# Patient Record
Sex: FEMALE | Race: BLACK OR AFRICAN AMERICAN
[De-identification: names, ages, dates, MRNs, and addresses within clinical notes are randomized per-mention and may not be internally consistent; named-entity substitution may affect disease eponyms.]

---

## 2020-12-03 ENCOUNTER — HOSPITAL ENCOUNTER (EMERGENCY)
Dept: HOSPITAL 72 - EMR | Age: 55
Discharge: HOME | End: 2020-12-03
Payer: MEDICAID

## 2020-12-03 VITALS — DIASTOLIC BLOOD PRESSURE: 80 MMHG | SYSTOLIC BLOOD PRESSURE: 134 MMHG

## 2020-12-03 VITALS — BODY MASS INDEX: 37.22 KG/M2 | WEIGHT: 260 LBS | HEIGHT: 70 IN

## 2020-12-03 VITALS — SYSTOLIC BLOOD PRESSURE: 140 MMHG | DIASTOLIC BLOOD PRESSURE: 88 MMHG

## 2020-12-03 DIAGNOSIS — Z90.710: ICD-10-CM

## 2020-12-03 DIAGNOSIS — I49.1: Primary | ICD-10-CM

## 2020-12-03 DIAGNOSIS — R00.2: ICD-10-CM

## 2020-12-03 DIAGNOSIS — Z88.5: ICD-10-CM

## 2020-12-03 LAB
ADD MANUAL DIFF: NO
ALBUMIN SERPL-MCNC: 3.5 G/DL (ref 3.4–5)
ALBUMIN/GLOB SERPL: 0.9 {RATIO} (ref 1–2.7)
ALP SERPL-CCNC: 66 U/L (ref 46–116)
ALT SERPL-CCNC: 15 U/L (ref 12–78)
ANION GAP SERPL CALC-SCNC: 8 MMOL/L (ref 5–15)
APTT BLD: 27 SEC (ref 23–33)
AST SERPL-CCNC: 12 U/L (ref 15–37)
BASOPHILS NFR BLD AUTO: 1.5 % (ref 0–2)
BILIRUB SERPL-MCNC: 0.3 MG/DL (ref 0.2–1)
BUN SERPL-MCNC: 11 MG/DL (ref 7–18)
CALCIUM SERPL-MCNC: 8.6 MG/DL (ref 8.5–10.1)
CHLORIDE SERPL-SCNC: 103 MMOL/L (ref 98–107)
CK SERPL-CCNC: 279 U/L (ref 26–308)
CO2 SERPL-SCNC: 28 MMOL/L (ref 21–32)
CREAT SERPL-MCNC: 1.2 MG/DL (ref 0.55–1.3)
EOSINOPHIL NFR BLD AUTO: 2.6 % (ref 0–3)
ERYTHROCYTE [DISTWIDTH] IN BLOOD BY AUTOMATED COUNT: 14.1 % (ref 11.6–14.8)
GLOBULIN SER-MCNC: 3.7 G/DL
HCT VFR BLD CALC: 39.3 % (ref 37–47)
HGB BLD-MCNC: 12.5 G/DL (ref 12–16)
INR PPP: 1 (ref 0.9–1.1)
LYMPHOCYTES NFR BLD AUTO: 27.3 % (ref 20–45)
MCV RBC AUTO: 97 FL (ref 80–99)
MONOCYTES NFR BLD AUTO: 7 % (ref 1–10)
NEUTROPHILS NFR BLD AUTO: 61.6 % (ref 45–75)
PLATELET # BLD: 228 K/UL (ref 150–450)
POTASSIUM SERPL-SCNC: 3.7 MMOL/L (ref 3.5–5.1)
RBC # BLD AUTO: 4.03 M/UL (ref 4.2–5.4)
SODIUM SERPL-SCNC: 139 MMOL/L (ref 136–145)
WBC # BLD AUTO: 6.2 K/UL (ref 4.8–10.8)

## 2020-12-03 PROCEDURE — 93005 ELECTROCARDIOGRAM TRACING: CPT

## 2020-12-03 PROCEDURE — 71045 X-RAY EXAM CHEST 1 VIEW: CPT

## 2020-12-03 PROCEDURE — 85610 PROTHROMBIN TIME: CPT

## 2020-12-03 PROCEDURE — 85730 THROMBOPLASTIN TIME PARTIAL: CPT

## 2020-12-03 PROCEDURE — 85025 COMPLETE CBC W/AUTO DIFF WBC: CPT

## 2020-12-03 PROCEDURE — 84484 ASSAY OF TROPONIN QUANT: CPT

## 2020-12-03 PROCEDURE — 36415 COLL VENOUS BLD VENIPUNCTURE: CPT

## 2020-12-03 PROCEDURE — 99284 EMERGENCY DEPT VISIT MOD MDM: CPT

## 2020-12-03 PROCEDURE — 80053 COMPREHEN METABOLIC PANEL: CPT

## 2020-12-03 PROCEDURE — 82550 ASSAY OF CK (CPK): CPT

## 2020-12-03 PROCEDURE — 83880 ASSAY OF NATRIURETIC PEPTIDE: CPT

## 2020-12-03 PROCEDURE — 84443 ASSAY THYROID STIM HORMONE: CPT

## 2020-12-03 NOTE — DIAGNOSTIC IMAGING REPORT
Indication: Chest pain

 

Technique: One view of the chest

 

Comparison: none

 

Findings: The heart is enlarged. The lungs and pleural spaces are clear.

 

Impression: Cardiomegaly. No acute process

## 2020-12-03 NOTE — EMERGENCY ROOM REPORT
History of Present Illness


General


Chief Complaint:  Dizziness


Source:  Patient





Present Illness


HPI


Patient started having palpitations approximately 530 this evening.  She denies 

any chest pain shortness of breath.  Feels like when she has some that they take

the wind away from her but otherwise she is not short of breath.  Her brother 

has history of DVTs recently diagnosed 2 or 3 months ago.  She denies swelling 

in her calfs or pain.  The patient denies fevers or chills.  There is no 

productive cough.  The patient denies he symptoms happening in the past.  She 

has some underlying stress but not overwhelming.  She denies ingestion of 

alcohol.  She does not take any prescribed medications however she does take 

some herbs.  He feels some dizziness occasionally when this happens but has not 

lost consciousness or had change in her vision.





Patient denies exposure to Covid positive contacts.





No sore throat, chest pain, nausea, vomiting, diarrhea, dysuria, abdominal pain,

joint pain, rashes, depression, headache.


Allergies:  


Coded Allergies:  


     CODEINE (Verified  Allergy, Mild, Hives, 6/19/13)





COVID-19 Screening


Contact w/high risk pt:  No


Experienced COVID-19 symptoms?:  No


COVID-19 Testing performed PTA:  No





Patient History


Past Surgical History:  hysterectomy


Social History:  Denies: smoking


Social History Narrative


From home


Pregnant Now:  No


Reviewed Nursing Documentation:  PMH: Agreed; PSxH: Agreed





Nursing Documentation-PMH


Past Medical History:  No History, Except For





Review of Systems


All Other Systems:  negative except mentioned in HPI





Physical Exam





Vital Signs








  Date Time  Temp Pulse Resp B/P (MAP) Pulse Ox O2 Delivery O2 Flow Rate FiO2


 


12/3/20 02:34 97.7 86 18 141/80 (100) 94 Room Air  








Sp02 EP Interpretation:  reviewed, normal


General Appearance:  well appearing, no apparent distress, GCS 15


Head:  normocephalic


Eyes:  bilateral eye normal inspection, bilateral eye PERRL, bilateral eye EOMI


ENT:  moist mucus membranes


Neck:  supple


Respiratory:  lungs clear, normal breath sounds


Cardiovascular #1:  regular rate, rhythm - With some occasional irregular beats,

no edema, systolic murmur - 1/6 heard loudest aortic area not radiating to the 

axilla


Cardiovascular #2:  2+ radial (R)


Gastrointestinal:  normal inspection, normal bowel sounds, non tender, no mass, 

non-distended


Musculoskeletal:  back normal, normal range of motion, no calf tenderness, 

gait/station normal


Neurologic:  alert, oriented x3, grossly normal


Psychiatric:  mood/affect normal


Skin:  no rash, warm/dry





Medical Decision Making


Diagnostic Impression:  


   Primary Impression:  


   PAC (premature atrial contraction)


   Additional Impression:  


   Palpitations


ER Course


Patient presents with palpitations and dizziness.  Differential includes 

arrhythmia, dehydration, pulmonary embolus, anxiety, coronary syndrome, 

electrolyte abnormality amongst others.  Evaluation with EKG, chest x-ray and 

labs.  Patient placed on cardiac monitor.  Cardiac risk factors are low.  

Patient does have a family history for pulmonary embolus however due to vital 

signs and history being stable this diagnosis is less likely.





EKG sinus rhythm.  Second EKG obtained by me with frequent PACs.  Chest x-ray 

normal.  CBC and CMP normal.  Troponin negative.





Ectopy is decreased.  However propranolol 10 mg is administered orally.





Discussed findings with patient.  Discussed the need for outpatient reevaluation

and consultation with cardiologist with echocardiogram suggested.





No apparent medical emergency at this time.





Patient improved and stable for outpatient observation and treatment.





Late add on: TSH high.  Message left with patient for further testing. 12/4





Laboratory Tests








Test


 12/3/20


03:03


 


White Blood Count


 6.2 K/UL


(4.8-10.8)


 


Red Blood Count


 4.03 M/UL


(4.20-5.40)  L


 


Hemoglobin


 12.5 G/DL


(12.0-16.0)


 


Hematocrit


 39.3 %


(37.0-47.0)


 


Mean Corpuscular Volume 97 FL (80-99)  


 


Mean Corpuscular Hemoglobin


 31.1 PG


(27.0-31.0)  H


 


Mean Corpuscular Hemoglobin


Concent 32.0 G/DL


(32.0-36.0)


 


Red Cell Distribution Width


 14.1 %


(11.6-14.8)


 


Platelet Count


 228 K/UL


(150-450)


 


Mean Platelet Volume


 7.9 FL


(6.5-10.1)


 


Neutrophils (%) (Auto)


 61.6 %


(45.0-75.0)


 


Lymphocytes (%) (Auto)


 27.3 %


(20.0-45.0)


 


Monocytes (%) (Auto)


 7.0 %


(1.0-10.0)


 


Eosinophils (%) (Auto)


 2.6 %


(0.0-3.0)


 


Basophils (%) (Auto)


 1.5 %


(0.0-2.0)


 


Prothrombin Time


 11.0 SEC


(9.30-11.50)


 


Prothrombin Time INR 1.0 (0.9-1.1)  


 


Activated Partial


Thromboplast Time 27 SEC (23-33)





 


Sodium Level


 139 MMOL/L


(136-145)


 


Potassium Level


 3.7 MMOL/L


(3.5-5.1)


 


Chloride Level


 103 MMOL/L


()


 


Carbon Dioxide Level


 28 MMOL/L


(21-32)


 


Anion Gap


 8 mmol/L


(5-15)


 


Blood Urea Nitrogen


 11 mg/dL


(7-18)


 


Creatinine


 1.2 MG/DL


(0.55-1.30)


 


Estimated Glomerular


Filtration Rate 56.1 mL/min


(>60)


 


Glucose Level


 134 MG/DL


()  H


 


Calcium Level


 8.6 MG/DL


(8.5-10.1)


 


Total Bilirubin


 0.3 MG/DL


(0.2-1.0)


 


Aspartate Amino Transferase


(AST) 12 U/L (15-37)


L


 


Alanine Aminotransferase (ALT)


 15 U/L (12-78)





 


Alkaline Phosphatase


 66 U/L


()


 


Total Creatine Kinase


 279 U/L


()


 


Troponin I


 0.009 ng/mL


(0.000-0.056)


 


Pro-B-Type Natriuretic Peptide


 291 pg/mL


(0-125)  H


 


Total Protein


 7.2 G/DL


(6.4-8.2)


 


Albumin


 3.5 G/DL


(3.4-5.0)


 


Globulin 3.7 g/dL  


 


Albumin/Globulin Ratio


 0.9 (1.0-2.7)


L








EKG Diagnostic Results


Rate:  normal


Rhythm:  NSR


ST Segments:  no acute changes





Rhythm Strip Diag. Results


EP Interpretation:  yes


Rhythm:  NSR, other - PACs rate 106 NSR without pac's





Chest X-Ray Diagnostic Results


Chest X-Ray Diagnostic Results :  


   Chest X-Ray Ordered:  Yes


   # of Views/Limited/Complete:  1 View


   Indication:  Other


   EP Interpretation:  Yes


   Interpretation:  no consolidation, no effusion, no pneumothorax


   Impression:  No acute disease


   Electronically Signed by:  Electronically signed by Marcelino Castrejon MD





Last Vital Signs








  Date Time  Temp Pulse Resp B/P (MAP) Pulse Ox O2 Delivery O2 Flow Rate FiO2


 


12/3/20 02:34 97.7 86 18 141/80 (100) 94 Room Air  








Status:  improved


Disposition:  HOME, SELF-CARE


Condition:  Improved


Scripts


Propranolol Hcl* (INDERAL*) 10 Mg Tablet


10 MG ORAL BID PRN for palpitations, #20 TAB 1 Refill


   Prov: Marcelino Castrejon MD         12/3/20


Referrals:  


[facility],REFERRING (PCP)











Marcelino Castrejon MD                 Dec 3, 2020 02:57

## 2020-12-03 NOTE — NUR
ED Nurse Note:



Patient walked into ED for c/o palpitaitons onset around 1700 yesterday. 
Patient states she was carrying a pack of water bottles when she had the sudden 
onset of palpitations. She also notes shortness of breath associated with the 
palpitations. She is aaox4, breathing is normal and unlabored at this time. 
Denies chest pain, fever, chills, cough, N/V. Patient connected to cardiac 
monitor and placed into gown. Safety measures in place.